# Patient Record
Sex: MALE | Race: BLACK OR AFRICAN AMERICAN | Employment: UNEMPLOYED | URBAN - METROPOLITAN AREA
[De-identification: names, ages, dates, MRNs, and addresses within clinical notes are randomized per-mention and may not be internally consistent; named-entity substitution may affect disease eponyms.]

---

## 2020-12-24 ENCOUNTER — HOSPITAL ENCOUNTER (EMERGENCY)
Age: 30
Discharge: HOME | End: 2020-12-24
Attending: STUDENT IN AN ORGANIZED HEALTH CARE EDUCATION/TRAINING PROGRAM
Payer: MEDICAID

## 2020-12-24 ENCOUNTER — APPOINTMENT (OUTPATIENT)
Dept: GENERAL RADIOLOGY | Age: 30
End: 2020-12-24
Attending: STUDENT IN AN ORGANIZED HEALTH CARE EDUCATION/TRAINING PROGRAM
Payer: MEDICAID

## 2020-12-24 VITALS
SYSTOLIC BLOOD PRESSURE: 113 MMHG | HEIGHT: 72 IN | OXYGEN SATURATION: 97 % | WEIGHT: 212.3 LBS | BODY MASS INDEX: 28.76 KG/M2 | HEART RATE: 66 BPM | TEMPERATURE: 97.1 F | RESPIRATION RATE: 18 BRPM | DIASTOLIC BLOOD PRESSURE: 66 MMHG

## 2020-12-24 DIAGNOSIS — L03.119 CELLULITIS AND ABSCESS OF HAND: Primary | ICD-10-CM

## 2020-12-24 DIAGNOSIS — L02.519 CELLULITIS AND ABSCESS OF HAND: Primary | ICD-10-CM

## 2020-12-24 LAB
ALBUMIN SERPL-MCNC: 3.8 G/DL (ref 3.5–5)
ALBUMIN/GLOB SERPL: 1.1 {RATIO} (ref 1.1–2.2)
ALP SERPL-CCNC: 94 U/L (ref 45–117)
ALT SERPL-CCNC: 26 U/L (ref 12–78)
ANION GAP SERPL CALC-SCNC: 1 MMOL/L (ref 5–15)
AST SERPL-CCNC: 24 U/L (ref 15–37)
BASOPHILS # BLD: 0 K/UL (ref 0–0.1)
BASOPHILS NFR BLD: 1 % (ref 0–1)
BILIRUB SERPL-MCNC: 0.7 MG/DL (ref 0.2–1)
BUN SERPL-MCNC: 8 MG/DL (ref 6–20)
BUN/CREAT SERPL: 10 (ref 12–20)
CALCIUM SERPL-MCNC: 9.1 MG/DL (ref 8.5–10.1)
CHLORIDE SERPL-SCNC: 108 MMOL/L (ref 97–108)
CO2 SERPL-SCNC: 29 MMOL/L (ref 21–32)
COMMENT, HOLDF: NORMAL
CREAT SERPL-MCNC: 0.8 MG/DL (ref 0.7–1.3)
CRP SERPL-MCNC: 0.86 MG/DL (ref 0–0.6)
DIFFERENTIAL METHOD BLD: ABNORMAL
EOSINOPHIL # BLD: 0.1 K/UL (ref 0–0.4)
EOSINOPHIL NFR BLD: 1 % (ref 0–7)
ERYTHROCYTE [DISTWIDTH] IN BLOOD BY AUTOMATED COUNT: 12.5 % (ref 11.5–14.5)
ERYTHROCYTE [SEDIMENTATION RATE] IN BLOOD: 7 MM/HR (ref 0–15)
GLOBULIN SER CALC-MCNC: 3.5 G/DL (ref 2–4)
GLUCOSE SERPL-MCNC: 96 MG/DL (ref 65–100)
HCT VFR BLD AUTO: 43.9 % (ref 36.6–50.3)
HGB BLD-MCNC: 14.6 G/DL (ref 12.1–17)
IMM GRANULOCYTES # BLD AUTO: 0 K/UL (ref 0–0.04)
IMM GRANULOCYTES NFR BLD AUTO: 0 % (ref 0–0.5)
LACTATE BLD-SCNC: 0.75 MMOL/L (ref 0.4–2)
LYMPHOCYTES # BLD: 1.2 K/UL (ref 0.8–3.5)
LYMPHOCYTES NFR BLD: 15 % (ref 12–49)
MCH RBC QN AUTO: 29.9 PG (ref 26–34)
MCHC RBC AUTO-ENTMCNC: 33.3 G/DL (ref 30–36.5)
MCV RBC AUTO: 89.8 FL (ref 80–99)
MONOCYTES # BLD: 0.5 K/UL (ref 0–1)
MONOCYTES NFR BLD: 6 % (ref 5–13)
NEUTS SEG # BLD: 6.2 K/UL (ref 1.8–8)
NEUTS SEG NFR BLD: 77 % (ref 32–75)
NRBC # BLD: 0 K/UL (ref 0–0.01)
NRBC BLD-RTO: 0 PER 100 WBC
PLATELET # BLD AUTO: 226 K/UL (ref 150–400)
PMV BLD AUTO: 9.3 FL (ref 8.9–12.9)
POTASSIUM SERPL-SCNC: 3.9 MMOL/L (ref 3.5–5.1)
PROT SERPL-MCNC: 7.3 G/DL (ref 6.4–8.2)
RBC # BLD AUTO: 4.89 M/UL (ref 4.1–5.7)
SAMPLES BEING HELD,HOLD: NORMAL
SODIUM SERPL-SCNC: 138 MMOL/L (ref 136–145)
WBC # BLD AUTO: 8 K/UL (ref 4.1–11.1)

## 2020-12-24 PROCEDURE — 86140 C-REACTIVE PROTEIN: CPT

## 2020-12-24 PROCEDURE — 80053 COMPREHEN METABOLIC PANEL: CPT

## 2020-12-24 PROCEDURE — 36415 COLL VENOUS BLD VENIPUNCTURE: CPT

## 2020-12-24 PROCEDURE — 99283 EMERGENCY DEPT VISIT LOW MDM: CPT

## 2020-12-24 PROCEDURE — 73130 X-RAY EXAM OF HAND: CPT

## 2020-12-24 PROCEDURE — 87147 CULTURE TYPE IMMUNOLOGIC: CPT

## 2020-12-24 PROCEDURE — 87205 SMEAR GRAM STAIN: CPT

## 2020-12-24 PROCEDURE — 75810000289 HC I&D ABSCESS SIMP/COMP/MULT

## 2020-12-24 PROCEDURE — 83605 ASSAY OF LACTIC ACID: CPT

## 2020-12-24 PROCEDURE — 85025 COMPLETE CBC W/AUTO DIFF WBC: CPT

## 2020-12-24 PROCEDURE — 85652 RBC SED RATE AUTOMATED: CPT

## 2020-12-24 RX ORDER — BACITRACIN 500 UNIT/G
1 PACKET (EA) TOPICAL
Status: DISCONTINUED | OUTPATIENT
Start: 2020-12-24 | End: 2020-12-24 | Stop reason: HOSPADM

## 2020-12-24 RX ORDER — CEPHALEXIN 500 MG/1
500 CAPSULE ORAL 3 TIMES DAILY
Qty: 21 CAP | Refills: 0 | Status: SHIPPED | OUTPATIENT
Start: 2020-12-24 | End: 2020-12-31

## 2020-12-24 RX ORDER — LIDOCAINE HYDROCHLORIDE AND EPINEPHRINE 10; 10 MG/ML; UG/ML
1.5 INJECTION, SOLUTION INFILTRATION; PERINEURAL ONCE
Status: DISCONTINUED | OUTPATIENT
Start: 2020-12-24 | End: 2020-12-24 | Stop reason: HOSPADM

## 2020-12-24 RX ORDER — SULFAMETHOXAZOLE AND TRIMETHOPRIM 800; 160 MG/1; MG/1
1 TABLET ORAL 2 TIMES DAILY
Qty: 14 TAB | Refills: 0 | Status: SHIPPED | OUTPATIENT
Start: 2020-12-24 | End: 2020-12-31

## 2020-12-24 NOTE — ED TRIAGE NOTES
Patient arrives with complaint of left hand pain and swelling. States pain radiates to left elbow. Reports taking ibuprofen PTA. Patient states that he fractured his hand in September, 2020. Patient was seen at [facility] ED, but states he never followed up with ortho. Patient states he is an IV drug user, and injected heroin into his left hand two days ago. States he has had increased pain and swelling since then. Patient is A&O x 4. Denies SOB, chest pain, fever, cough.

## 2020-12-24 NOTE — ED NOTES
Patient discharged by provider. D/C instructions given. Pt educated to take r medications as instructed for management at home. Pt verbalized understanding, verbalized no questions. PIV removed, pressure dressing applied. Pt ambulated out of ER without difficulty, NAD.   Patient Vitals for the past 4 hrs:   Temp Pulse Resp BP SpO2   12/24/20 0801 97.1 °F (36.2 °C) 66 18 113/66 97 %

## 2020-12-24 NOTE — ED PROVIDER NOTES
69-year-old gentleman presenting with hand pain. Patient states that he injected his left hand with a heroin needle 2 days ago and noticed some local swelling which is progressed. Is confined to the dorsum of his hand. Is not had fevers or chills or any proximal redness. No difficulty moving his fingers and no pain with finger movement. No vomiting, diarrhea. No sick contacts. Patient is right-hand dominant. Past Medical History:   Diagnosis Date    Asthma        No past surgical history on file. No family history on file. Social History     Socioeconomic History    Marital status: SINGLE     Spouse name: Not on file    Number of children: Not on file    Years of education: Not on file    Highest education level: Not on file   Occupational History    Not on file   Social Needs    Financial resource strain: Not on file    Food insecurity     Worry: Not on file     Inability: Not on file    Transportation needs     Medical: Not on file     Non-medical: Not on file   Tobacco Use    Smoking status: Current Every Day Smoker     Packs/day: 0.50   Substance and Sexual Activity    Alcohol use:  Yes    Drug use: Not on file    Sexual activity: Not on file   Lifestyle    Physical activity     Days per week: Not on file     Minutes per session: Not on file    Stress: Not on file   Relationships    Social connections     Talks on phone: Not on file     Gets together: Not on file     Attends Hoahaoism service: Not on file     Active member of club or organization: Not on file     Attends meetings of clubs or organizations: Not on file     Relationship status: Not on file    Intimate partner violence     Fear of current or ex partner: Not on file     Emotionally abused: Not on file     Physically abused: Not on file     Forced sexual activity: Not on file   Other Topics Concern    Not on file   Social History Narrative    Not on file         ALLERGIES: Patient has no known allergies. Review of Systems   Constitutional: Negative for chills, fatigue and fever. HENT: Negative for ear pain, sore throat and trouble swallowing. Eyes: Negative for visual disturbance. Respiratory: Negative for cough and shortness of breath. Cardiovascular: Negative for chest pain. Gastrointestinal: Negative for abdominal pain. Genitourinary: Negative for dysuria. Musculoskeletal: Negative for back pain. Skin: Positive for rash. Neurological: Negative for light-headedness and headaches. Psychiatric/Behavioral: Negative for confusion. All other systems reviewed and are negative. Vitals:    12/24/20 0801   BP: 113/66   Pulse: 66   Resp: 18   Temp: 97.1 °F (36.2 °C)   SpO2: 97%   Weight: 96.3 kg (212 lb 4.9 oz)   Height: 6' (1.829 m)            Physical Exam  Vitals signs reviewed. Constitutional:       General: He is not in acute distress. HENT:      Head: Normocephalic and atraumatic. Mouth/Throat:      Mouth: Mucous membranes are moist.      Pharynx: Oropharynx is clear. Cardiovascular:      Rate and Rhythm: Normal rate and regular rhythm. Heart sounds: Normal heart sounds. Pulmonary:      Effort: Pulmonary effort is normal.      Breath sounds: Normal breath sounds. Abdominal:      Tenderness: There is no abdominal tenderness. There is no guarding or rebound. Musculoskeletal: Normal range of motion. Right wrist: Normal. He exhibits no tenderness and no bony tenderness. Hands:    Skin:     General: Skin is warm and dry. Capillary Refill: Capillary refill takes less than 2 seconds. Neurological:      General: No focal deficit present. Mental Status: He is alert and oriented to person, place, and time.    Psychiatric:         Mood and Affect: Mood normal.              MDM       I&D Abcess Simple    Date/Time: 12/24/2020 10:21 AM  Performed by: West Turner MD  Authorized by: West Turner MD     Location:     Type:  Abscess Size:  1 cm    Location: Left hand dorsum. Pre-procedure details:     Skin preparation:  Betadine  Anesthesia (see MAR for exact dosages): Anesthesia method:  Local infiltration    Local anesthetic:  Lidocaine 1% WITH epi  Procedure type:     Complexity:  Simple  Procedure details:     Incision types:  Single straight    Scalpel blade:  11    Wound management:  Probed and deloculated and irrigated with saline    Drainage:  Purulent    Drainage amount: Moderate    Wound treatment:  Wound left open  Post-procedure details:     Patient tolerance of procedure: Tolerated well, no immediate complications          Mayda Morales is a 31 y.o. male presenting with hand pain. Differential includes cellulitis, abscess, subcutaneous emphysema, fracture. Course: Drained, patient feeling better, placed in a bulky dressing, follow-up in the emergency department if any worse or no improvement in the next few days for wound check. Return if progressing immediately. Return if any systemic signs of illness. He does mention that he did have a hand fracture in the recent past although this is not demonstrated based on the read of his x-ray.

## 2020-12-26 LAB
BACTERIA SPEC CULT: ABNORMAL
GRAM STN SPEC: ABNORMAL
GRAM STN SPEC: ABNORMAL
SERVICE CMNT-IMP: ABNORMAL

## 2023-09-26 ENCOUNTER — OFFICE VISIT (OUTPATIENT)
Age: 33
End: 2023-09-26
Payer: MEDICAID

## 2023-09-26 VITALS
HEIGHT: 72 IN | BODY MASS INDEX: 34.1 KG/M2 | WEIGHT: 251.77 LBS | DIASTOLIC BLOOD PRESSURE: 73 MMHG | OXYGEN SATURATION: 96 % | HEART RATE: 65 BPM | RESPIRATION RATE: 18 BRPM | SYSTOLIC BLOOD PRESSURE: 113 MMHG | TEMPERATURE: 98.6 F

## 2023-09-26 DIAGNOSIS — L20.89 FLEXURAL ATOPIC DERMATITIS: ICD-10-CM

## 2023-09-26 DIAGNOSIS — F11.91 HISTORY OF HEROIN USE: ICD-10-CM

## 2023-09-26 DIAGNOSIS — B19.20 HEPATITIS C VIRUS INFECTION WITHOUT HEPATIC COMA, UNSPECIFIED CHRONICITY: ICD-10-CM

## 2023-09-26 DIAGNOSIS — J45.40 MODERATE PERSISTENT ASTHMA WITHOUT COMPLICATION: Primary | ICD-10-CM

## 2023-09-26 DIAGNOSIS — F17.210 SMOKING GREATER THAN 10 PACK YEARS: ICD-10-CM

## 2023-09-26 DIAGNOSIS — Z23 ENCOUNTER FOR IMMUNIZATION: ICD-10-CM

## 2023-09-26 PROBLEM — J45.909 ASTHMA: Status: ACTIVE | Noted: 2023-09-26

## 2023-09-26 PROCEDURE — 90471 IMMUNIZATION ADMIN: CPT | Performed by: STUDENT IN AN ORGANIZED HEALTH CARE EDUCATION/TRAINING PROGRAM

## 2023-09-26 PROCEDURE — 90674 CCIIV4 VAC NO PRSV 0.5 ML IM: CPT | Performed by: STUDENT IN AN ORGANIZED HEALTH CARE EDUCATION/TRAINING PROGRAM

## 2023-09-26 PROCEDURE — 99203 OFFICE O/P NEW LOW 30 MIN: CPT | Performed by: STUDENT IN AN ORGANIZED HEALTH CARE EDUCATION/TRAINING PROGRAM

## 2023-09-26 RX ORDER — CLOBETASOL PROPIONATE 0.5 MG/G
OINTMENT TOPICAL
Qty: 60 G | Refills: 1 | Status: SHIPPED | OUTPATIENT
Start: 2023-09-26

## 2023-09-26 RX ORDER — ALBUTEROL SULFATE 90 UG/1
2 AEROSOL, METERED RESPIRATORY (INHALATION) EVERY 6 HOURS PRN
COMMUNITY

## 2023-09-26 SDOH — ECONOMIC STABILITY: HOUSING INSECURITY
IN THE LAST 12 MONTHS, WAS THERE A TIME WHEN YOU DID NOT HAVE A STEADY PLACE TO SLEEP OR SLEPT IN A SHELTER (INCLUDING NOW)?: NO

## 2023-09-26 SDOH — ECONOMIC STABILITY: FOOD INSECURITY: WITHIN THE PAST 12 MONTHS, THE FOOD YOU BOUGHT JUST DIDN'T LAST AND YOU DIDN'T HAVE MONEY TO GET MORE.: SOMETIMES TRUE

## 2023-09-26 SDOH — ECONOMIC STABILITY: FOOD INSECURITY: WITHIN THE PAST 12 MONTHS, YOU WORRIED THAT YOUR FOOD WOULD RUN OUT BEFORE YOU GOT MONEY TO BUY MORE.: SOMETIMES TRUE

## 2023-09-26 SDOH — ECONOMIC STABILITY: INCOME INSECURITY: HOW HARD IS IT FOR YOU TO PAY FOR THE VERY BASICS LIKE FOOD, HOUSING, MEDICAL CARE, AND HEATING?: HARD

## 2023-09-26 ASSESSMENT — PATIENT HEALTH QUESTIONNAIRE - PHQ9
SUM OF ALL RESPONSES TO PHQ QUESTIONS 1-9: 0
SUM OF ALL RESPONSES TO PHQ9 QUESTIONS 1 & 2: 0
SUM OF ALL RESPONSES TO PHQ QUESTIONS 1-9: 0
SUM OF ALL RESPONSES TO PHQ QUESTIONS 1-9: 0
1. LITTLE INTEREST OR PLEASURE IN DOING THINGS: 0
2. FEELING DOWN, DEPRESSED OR HOPELESS: 0
SUM OF ALL RESPONSES TO PHQ QUESTIONS 1-9: 0

## 2023-09-26 NOTE — PROGRESS NOTES
[facility], [address] [place] [address]   Office [phone], Fax [phone]    Subjective:   Ailyn Gonzales is an 28 y.o. male who presents to establish care   Patient was previously receiving care at: none  Medical history significant for:  Past Medical History:   Diagnosis Date    Asthma     Initially exercise induced as a child but more recently has needed to use albuterol    Hepatitis C 2015    S/p treatment with [facility] in 2022     Past Surgical History:   Procedure Laterality Date    ORTHOPEDIC SURGERY Right 2004    R thumb tendon repair       Currently doing well but would like to discuss medication to treat atopic dermatitis, affecting his groin area. 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 He was using topical steroids which helped somewhat but the lesions keep returning. He would like a referral to dermatology. He also has some knee pain for which he has been seen by a physician previously but we will discuss this at next visit.         Review of Systems   General/Constitutional:   No headache, fever, fatigue, weight loss or weight gain       Eyes:   No redness, pruritis, pain, visual changes, swelling, or discharge      Ears:    No pain, loss or changes in hearing     Neck:   No swelling, masses, stiffness, pain, or limited movement     Cardiac:    No chest pain      Respiratory:   No cough or shortness of breath     GI:   No nausea/vomiting, diarrhea, abdominal pain, bloody or dark stools       :   No dysuria or  hematuria    Neurological:   No loss of consciousness, dizziness, seizures, dysarthria, cognitive changes, memory changes,  problems with balance, or unilateral weakness     Skin: No rash     Current Medications  Current medications include:   Current Outpatient Medications   Medication Sig    albuterol sulfate HFA (VENTOLIN HFA) 108 (90 Base) MCG/ACT inhaler Inhale 2 puffs into the lungs every 6 hours as needed for Wheezing

## 2023-09-26 NOTE — PATIENT INSTRUCTIONS
FOOD RESOURCES    Feed [facility] Emergency Food Hotline  What they offer: Assistance with finding a food pantry, soup kitchen, or resource near you. Website:  https://[employer]. org/agency-network/agency-/ (search zip code)  Hunger Hotline Inquiry Form: https://[employer]. org/hunger-hotline-inquiry-form/  Hunger Hotline Phone Number: 084-707-9525 x [phone] (M-F 8 am -4 pm)  Online Meals on Wheels application available on this site: https://[employer]. org/how-we- help/meals-on-wheels/    SNAP (formerly [employer] [place])  What they offer: SNAP is used like cash to buy eligible food items from authorized retailers. Apply for benefits online:   Apply for benefits by phone: [phone] (M-F 8 am - 7 pm; Sat 9 am - 12 pm)    Meals on Wheels  What they offer: Meals on Wheels is a program that delivers meals to individuals who have no reliable means for maintaining a healthy diet. Website:  https://[employer]. org/how-we-help/meals-on-wheels/  To Apply:  Ages 23-57:  Apply online: https://[employer] org/meals-on-wheels-application-form/  Apply by phone: [phone]  Ages 60+:  Contact [employer]: [phone] (M-F 830 am- 5 pm)    Need additional resources? Call 211 or Find Help https://www. findhelp.org/    [facility]- [address] [facility] [phone];  Thursdays 10 pm - 12 pm    [facility], [address] [place] [address] [address] [phone]; Mondays 10 am-2 pm & Wednesdays 1230 pm - 230 pm    [place] [address] [address], [address] [place] [address] [phone]; 2nd Thursdays of the month 12 pm - 3 pm    [facility], [address] [place] [address] [phone]; 3rd Wednesdays of the month 12 pm - 3 pm  [facility] [address], [place], [address] [facility] [phone]; M/Thurs/Sat 9 am - 12 pm & Wednesday 7

## 2023-09-26 NOTE — PROGRESS NOTES
Pt here to establish as new patient and physical  Records requested from [facility] alternative program [phone])  f- [phone]

## 2023-10-09 ENCOUNTER — TELEPHONE (OUTPATIENT)
Age: 33
End: 2023-10-09

## 2023-10-09 NOTE — TELEPHONE ENCOUNTER
Patient's wife called stating that the patient still has same rash he had at his recent appointment on 9/26 and it is not getting any better. She stated that he used all of the Hydrocortisone cream and the other cream he didn't use due to some side effects they read on the instructions. I asked if he would like to schedule an appointment to come in and be reevaluated, but she stated that she would like for a message to be sent to see what the doctor would suggest. Patient or patient's wife would like to be contacted at your earliest convenience to discuss this matter. Thanks!

## 2023-10-10 ENCOUNTER — CLINICAL DOCUMENTATION (OUTPATIENT)
Age: 33
End: 2023-10-10

## 2023-10-10 RX ORDER — NYSTATIN 100000 [USP'U]/G
POWDER TOPICAL
Qty: 30 G | Refills: 1 | Status: SHIPPED | OUTPATIENT
Start: 2023-10-10

## 2023-10-10 NOTE — PROGRESS NOTES
Called patient regarding rash which is not improving. Pt did not use the clobetasol as prescribed because of side effects he read online. Will try topical nystatin since hydrocortisone has not bee helping but pt needs to see dermatology. Patient advised of the plan and he is agreeable.      Loretta Morris MD